# Patient Record
Sex: MALE | Race: WHITE | NOT HISPANIC OR LATINO | URBAN - METROPOLITAN AREA
[De-identification: names, ages, dates, MRNs, and addresses within clinical notes are randomized per-mention and may not be internally consistent; named-entity substitution may affect disease eponyms.]

---

## 2017-11-28 ENCOUNTER — ALLSCRIPTS OFFICE VISIT (OUTPATIENT)
Dept: OTHER | Facility: OTHER | Age: 57
End: 2017-11-28

## 2017-12-13 ENCOUNTER — ALLSCRIPTS OFFICE VISIT (OUTPATIENT)
Dept: OTHER | Facility: OTHER | Age: 57
End: 2017-12-13

## 2018-01-15 NOTE — PROCEDURES
Results/Data    Procedure: Electromyogram and Nerve Conduction Study  Indication: Bilateral Lower Extremities   Referred by Dr Hung Meza  The procedure's were discussed with the patient  Written consent was obtained prior to the procedure and is detailed in the patient's record  Prior to the start of the procedure a time out was taken and the identity of the patient was confirmed via name and date of birth with the patient  The correct site and the procedure to be performed were confirmed  The correct side was confirmed if applicable  The positioning of the patient was verified  The availability of the correct equipment was verified  Procedure Start Time: 9:30    Technique: A sterile concentric needle electrode was used  The patient tolerated the procedure well  There were no complications  Results :Motor and sensory nerve conduction studies were performed on the bilateral peroneal, tibial and sural nerves  The right peroneal and tibial compound motor action potentials were within normal limits  The left peroneal motor terminal latency was prolonged with a low compound motor action potential amplitude and normal conduction velocity distally and across the fibular head  The left tibial motor terminal latency was within normal limits with a normal compound motor action potential amplitude and a slow conduction velocity across the ankle  The bilateral peroneal and tibial F-waves were normal   The right sural sensory action potential was normal   The left sural sensory peak latency was within normal limits with a low sensory action potential amplitude  The bilateral H response was within normal limits  Concentric needle examination was performed and various proximal and distal muscles bilaterally including gluteus medius, vastus lateralis, tibialis anterior, medial gastrocnemius, EDB, L4-5 ,L5-S1 paraspinal myotomes and left AH     There was no evidence of active denervation in any of the muscles tested  Mild decreased recruitment of giant motor units was noted in the left gluteus medius and extensor digitorum brevis  Mild decreased recruitment of polyphasic motor units was noted in the left AH and right EDB  The compound motor unit action potentials were of normal configuration with interference patterns being full of full for effort in the remaining muscles tested  Interpretation:  There is electrophysiologic evidence of a:     1  Moderate chronic left L5 radiculopathy as evidenced by the low peroneal motor amplitude and chronic denervation changes  in the above-mentioned muscles  2  Mild asymmetric sensory motor peripheral neuropathy as evidenced by the abnormal motor and sensory nerve conduction studies  Clinical and imaging correlation of the lumbosacral spine is suggested        Signatures   Electronically signed by : Amanda Garcia MD; Nov 28 2017 10:29AM EST                       (Author)

## 2018-01-23 NOTE — PROCEDURES
Results/Data    Procedure: Electromyogram and Nerve Conduction Study  Indication: Bilateral Upper Extremities   Referred by Dr Chris Al  The procedure's were discussed with the patient  Written consent was obtained prior to the procedure and is detailed in the patient's record  Prior to the start of the procedure a time out was taken and the identity of the patient was confirmed via name and date of birth with the patient  The correct site and the procedure to be performed were confirmed  The correct side was confirmed if applicable  The positioning of the patient was verified  The availability of the correct equipment was verified  Procedure Start Time: 10:30    Technique: A sterile concentric needle electrode was used  The patient tolerated the procedure well  There were no complications  Results :Motor and sensory nerve conduction studies were performed on the bilateral median and ulnar nerves  The right median and ulnar compound motor action potentials were within normal limits  The left median motor terminal latency was within normal limits with a normal compound motor action potential amplitude and a slow conduction velocity across the wrist   The left ulnar motor terminal latency was within normal limits with a normal compound motor action potential amplitude and a slow conduction velocity distally and across the elbow  The bilateral median and ulnar F-waves were normal   The right median sensory peak latency was prolonged with a low sensory action potential amplitude  The left median sensory peak latency was prolonged with normal sensory action potential amplitude  The right ulnar  sensory peak latency was prolonged with normal sensory action potential amplitude  The left ulnar sensory peak latency was prolonged with normal sensory action potential amplitude  The right and left median  and ulnar palmar evoked response  was within normal limits      Concentric needle examination was performed   on various proximal and distal muscles bilaterally including deltoid, biceps, triceps, pronator teres, apb, FDI and low cervical paraspinals  There was no evidence of active denervation in any of the muscles tested  Mild decreased recruitment was noted in the  left abductor pollicis brevis and the left FDI  The compound motor unit action potentials were of normal configuration of the difference patterns being full of full forefoot in the remaining muscles tested  Interpretation:  There is electrophysiologic evidence of a:    1  Bilateral median nerve compression neuropathy at the wrist with demyelinative changes, mild in severity on the right and moderate in severity on the left, consistent with a diagnosis of carpal tunnel syndrome  2   Mild left ulnar neuropathy at the elbow with demyelinative changes  in addition, Moderate ulnar neuropathy at the wrist on the left  with demyelinative changes  3   There is no evidence of a cervical radiculopathy bilaterally  Clinical correlation is recommended        Signatures   Electronically signed by : Donalda Phoenix, MD; Dec 13 2017 11:23AM EST                       (Author)

## 2024-07-02 ENCOUNTER — TELEPHONE (OUTPATIENT)
Age: 64
End: 2024-07-02

## 2024-07-02 NOTE — TELEPHONE ENCOUNTER
Patient calling in to see if [facility] so he can set up an appt. I provided our fax number so [facility] can [facility]. Thank you

## 2024-07-09 ENCOUNTER — TELEPHONE (OUTPATIENT)
Age: 64
End: 2024-07-09

## 2024-07-17 ENCOUNTER — TELEPHONE (OUTPATIENT)
Age: 64
End: 2024-07-17

## 2024-08-01 ENCOUNTER — HOSPITAL ENCOUNTER (OUTPATIENT)
Dept: RADIOLOGY | Facility: HOSPITAL | Age: 64
End: 2024-08-01
Attending: PODIATRIST
Payer: COMMERCIAL

## 2024-08-01 ENCOUNTER — OFFICE VISIT (OUTPATIENT)
Dept: PODIATRY | Facility: CLINIC | Age: 64
End: 2024-08-01
Payer: COMMERCIAL

## 2024-08-01 VITALS
WEIGHT: 162 LBS | DIASTOLIC BLOOD PRESSURE: 78 MMHG | OXYGEN SATURATION: 97 % | HEART RATE: 83 BPM | SYSTOLIC BLOOD PRESSURE: 126 MMHG

## 2024-08-01 DIAGNOSIS — M21.619 BUNION: ICD-10-CM

## 2024-08-01 DIAGNOSIS — M20.42 HAMMER TOE OF LEFT FOOT: ICD-10-CM

## 2024-08-01 DIAGNOSIS — M20.42 HAMMER TOE OF LEFT FOOT: Primary | ICD-10-CM

## 2024-08-01 PROCEDURE — 99203 OFFICE O/P NEW LOW 30 MIN: CPT | Performed by: PODIATRIST

## 2024-08-01 PROCEDURE — 73630 X-RAY EXAM OF FOOT: CPT

## 2024-08-01 RX ORDER — ATORVASTATIN CALCIUM 40 MG/1
TABLET, FILM COATED ORAL
COMMUNITY
Start: 2024-02-21

## 2024-08-01 RX ORDER — CHLORHEXIDINE GLUCONATE ORAL RINSE 1.2 MG/ML
15 SOLUTION DENTAL ONCE
OUTPATIENT
Start: 2024-08-01 | End: 2024-08-01

## 2024-08-01 NOTE — PROGRESS NOTES
Assessment/Plan:     The patient's clinical examination today is significant for a moderate bunion deformity of the left foot.  There is a rigid hammertoe deformity of the left second digit with crossover deformity.  Most of the patient's tenderness can be localized to the dorsal aspect of the left second toe PIPJ.  Pedal pulses are palpable in the left lower extremity.    X-rays of the patient's left foot taken today were personally viewed interpreted.  Findings were significant for bunion deformity of the left foot.  There is a significant contracture deformity of the left second toe with crossover deformity.    Treatment options were discussed with the patient.  Due to the chronicity and severity of his pain, he would like to proceed with surgical dimension.  He would benefit from an Manisha/Akin osteotomy of the first ray in conjunction with a Weil osteotomy and arthrodesis of the left second toe.  The perioperative course was discussed with the patient and he is agreeable to proceed.  Informed consent was obtained.    Will initiate preoperative testing and scheduling.     Diagnoses and all orders for this visit:    Hammer toe of left foot  -     XR foot 3+ vw left; Future  -     Case request operating room: BUNIONECTOMY MANISHA and Darnell osteotomy, Weil OSTEOTOMY second METATARSAL, REPAIR HAMMERTOE / MALLET TOE / CLAW TOE left second toe; Standing  -     Comprehensive metabolic panel; Future  -     CBC and differential; Future  -     Case request operating room: BUNIONECTOMY MANISHA and Darnell osteotomy, Weil OSTEOTOMY second METATARSAL, REPAIR HAMMERTOE / MALLET TOE / CLAW TOE left second toe    Bunion  -     XR foot 3+ vw left; Future  -     Case request operating room: BUNIONECTOMY MANISHA and Darnell osteotomy, Weil OSTEOTOMY second METATARSAL, REPAIR HAMMERTOE / MALLET TOE / CLAW TOE left second toe; Standing  -     Comprehensive metabolic panel; Future  -     CBC and differential; Future  -     Case request  operating room: BUNIONECTOMY MANISHA and Darnell osteotomy, Weil OSTEOTOMY second METATARSAL, REPAIR HAMMERTOE / MALLET TOE / CLAW TOE left second toe    Other orders  -     atorvastatin (LIPITOR) 40 mg tablet; Take by mouth  -     Nursing Communication Warmimg Interventions Implemented; Standing  -     Nursing Communication CHG bath, have staff wash entire body (neck down) per pre-op bathing protocol. Routine, evening prior to, and day of surgery.; Standing  -     Nursing Communication Swab both nares with Povidone-Iodine solution, EXCLUDE if patient has shellfish/Iodine allergy, and replace with nasal alcohol swabstick. Routine, day of surgery, on call to OR; Standing  -     chlorhexidine (PERIDEX) 0.12 % oral rinse 15 mL  -     Void on call to OR; Standing  -     Insert peripheral IV; Standing  -     Diet NPO; Sips with meds; Standing  -     ceFAZolin (ANCEF) 2,000 mg in sodium chloride 0.9 % 50 mL IVPB          Subjective:     Patient ID: Boubacar Burnett Jr is a 64 y.o. male.    The patient presents today for his initial consultation with [facility] podiatry with a chief complaint of chronic left forefoot pain secondary to a hammertoe deformity.  The patient states that the hammertoe has been present for many years but it has gotten worse lately.  It is not painful in all types of close toed shoe gear.  He is also unable to wear his custom left foot orthoses secondary to this.  He is interested in surgical invention at this time.          PAST MEDICAL HISTORY:  Past Medical History:   Diagnosis Date    Hammer toe        PAST SURGICAL HISTORY:  History reviewed. No pertinent surgical history.     ALLERGIES:  Patient has no known allergies.    MEDICATIONS:  Current Outpatient Medications   Medication Sig Dispense Refill    atorvastatin (LIPITOR) 40 mg tablet Take by mouth       No current facility-administered medications for this visit.       SOCIAL HISTORY:  Social History     Socioeconomic History    Marital  status: Single     Spouse name: None    Number of children: None    Years of education: None    Highest education level: None   Occupational History    None   Tobacco Use    Smoking status: Every Day     Current packs/day: 1.00     Types: Cigarettes     Passive exposure: Current    Smokeless tobacco: Never   Vaping Use    Vaping status: Never Used   Substance and Sexual Activity    Alcohol use: Never    Drug use: Never    Sexual activity: Not Currently   Other Topics Concern    None   Social History Narrative    None     Social Determinants of Health     Financial Resource Strain: Not on file   Food Insecurity: Not on file   Transportation Needs: Not on file   Physical Activity: Not on file   Stress: Not on file   Social Connections: Unknown (6/18/2024)    Received from [email or web address]     How often do you feel lonely or isolated from those around you? (Adult - for ages 18 years and over): Not on file   Intimate Partner Violence: Not on file   Housing Stability: Not on file        Review of Systems   Constitutional: Negative.    HENT: Negative.     Eyes: Negative.    Respiratory: Negative.     Cardiovascular: Negative.    Endocrine: Negative.    Musculoskeletal: Negative.    Neurological: Negative.    Hematological: Negative.    Psychiatric/Behavioral: Negative.           Objective:     Physical Exam  Vitals reviewed.   Constitutional:       Appearance: Normal appearance.   HENT:      Head: Normocephalic and atraumatic.      Nose: Nose normal.   Eyes:      Conjunctiva/sclera: Conjunctivae normal.      Pupils: Pupils are equal, round, and reactive to light.   Cardiovascular:      Pulses:           Dorsalis pedis pulses are 2+ on the left side.        Posterior tibial pulses are 2+ on the left side.   Pulmonary:      Effort: Pulmonary effort is normal.   Musculoskeletal:      Left foot: Decreased range of motion. Bunion present.        Feet:    Feet:      Comments: The patient's clinical examination  today is significant for a moderate bunion deformity of the left foot.  There is a rigid hammertoe deformity of the left second digit with crossover deformity.  Most of the patient's tenderness can be localized to the dorsal aspect of the left second toe PIPJ.  Pedal pulses are palpable in the left lower extremity.    Skin:     General: Skin is warm.      Capillary Refill: Capillary refill takes less than 2 seconds.   Neurological:      General: No focal deficit present.      Mental Status: He is alert and oriented to person, place, and time.   Psychiatric:         Mood and Affect: Mood normal.         Behavior: Behavior normal.         Thought Content: Thought content normal.

## 2024-09-04 ENCOUNTER — ANESTHESIA EVENT (OUTPATIENT)
Dept: PERIOP | Facility: HOSPITAL | Age: 64
End: 2024-09-04
Payer: COMMERCIAL

## 2024-09-10 NOTE — PRE-PROCEDURE INSTRUCTIONS
Pre-Surgery Instructions:   Medication Instructions    atorvastatin (LIPITOR) 40 mg tablet Take day of surgery.    Medication instructions for day surgery reviewed. Please use only a sip of water to take your instructed medications. Avoid all over the counter vitamins, supplements and NSAIDS for one week prior to surgery per anesthesia guidelines. Tylenol is ok to take as needed.     You will receive a call one business day prior to surgery with an arrival time and hospital directions. If your surgery is scheduled on a Monday, the hospital will be calling you on the Friday prior to your surgery. If you have not heard from anyone by 8pm, please call the hospital supervisor through the hospital  at [phone]. ([place] [phone] or [place] [phone]).    Do not eat or drink anything after midnight the night before your surgery, including candy, mints, lifesavers, or chewing gum. Do not drink alcohol 24hrs before your surgery. Try not to smoke at least 24hrs before your surgery.       Follow the pre surgery showering instructions as listed in the “My Surgical Experience Booklet” or otherwise provided by your surgeon's office. Do not use a blade to shave the surgical area 1 week before surgery. It is okay to use a clean electric clippers up to 24 hours before surgery. Do not apply any lotions, creams, including makeup, cologne, deodorant, or perfumes after showering on the day of your surgery. Do not use dry shampoo, hair spray, hair gel, or any type of hair products.     No contact lenses, eye make-up, or artificial eyelashes. Remove nail polish, including gel polish, and any artificial, gel, or acrylic nails if possible. Remove all jewelry including rings and body piercing jewelry.     Wear causal clothing that is easy to take on and off. Consider your type of surgery.    Keep any valuables, jewelry, piercings at home. Please bring any specially ordered equipment (sling, braces) if  indicated.    Arrange for a responsible person to drive you to and from the hospital on the day of your surgery. Please confirm the visitor policy for the day of your procedure when you receive your phone call with an arrival time.     Call the surgeon's office with any new illnesses, exposures, or additional questions prior to surgery.    Please reference your “My Surgical Experience Booklet” for additional information to prepare for your upcoming surgery.

## 2024-09-13 ENCOUNTER — APPOINTMENT (OUTPATIENT)
Dept: RADIOLOGY | Facility: HOSPITAL | Age: 64
End: 2024-09-13
Payer: COMMERCIAL

## 2024-09-13 ENCOUNTER — ANESTHESIA (OUTPATIENT)
Dept: PERIOP | Facility: HOSPITAL | Age: 64
End: 2024-09-13
Payer: COMMERCIAL

## 2024-09-13 ENCOUNTER — HOSPITAL ENCOUNTER (OUTPATIENT)
Facility: HOSPITAL | Age: 64
Setting detail: OUTPATIENT SURGERY
Discharge: HOME | End: 2024-09-13
Attending: PODIATRIST | Admitting: PODIATRIST
Payer: COMMERCIAL

## 2024-09-13 VITALS
OXYGEN SATURATION: 98 % | WEIGHT: 158.9 LBS | HEIGHT: 66 IN | TEMPERATURE: 98 F | SYSTOLIC BLOOD PRESSURE: 128 MMHG | DIASTOLIC BLOOD PRESSURE: 70 MMHG | BODY MASS INDEX: 25.54 KG/M2 | RESPIRATION RATE: 14 BRPM | HEART RATE: 15 BPM

## 2024-09-13 DIAGNOSIS — Z98.890 STATUS POST BUNIONECTOMY: Primary | ICD-10-CM

## 2024-09-13 LAB
GLUCOSE SERPL-MCNC: 106 MG/DL (ref 65–140)
GLUCOSE SERPL-MCNC: 122 MG/DL (ref 65–140)

## 2024-09-13 PROCEDURE — 28285 REPAIR OF HAMMERTOE: CPT | Performed by: PODIATRIST

## 2024-09-13 PROCEDURE — C1713 ANCHOR/SCREW BN/BN,TIS/BN: HCPCS | Performed by: PODIATRIST

## 2024-09-13 PROCEDURE — 99024 POSTOP FOLLOW-UP VISIT: CPT | Performed by: PODIATRIST

## 2024-09-13 PROCEDURE — L8641 METATARSAL JOINT IMPLANT: HCPCS | Performed by: PODIATRIST

## 2024-09-13 PROCEDURE — 28299 COR HLX VLGS DOUBLE OSTEOT: CPT | Performed by: PODIATRIST

## 2024-09-13 PROCEDURE — 28308 INCISION OF METATARSAL: CPT | Performed by: PODIATRIST

## 2024-09-13 PROCEDURE — 82948 REAGENT STRIP/BLOOD GLUCOSE: CPT

## 2024-09-13 PROCEDURE — 73630 X-RAY EXAM OF FOOT: CPT

## 2024-09-13 DEVICE — CANNULATED SCREW
Type: IMPLANTABLE DEVICE | Site: FOOT | Status: FUNCTIONAL
Brand: ASNIS

## 2024-09-13 DEVICE — HAMMERTOE K-WIRE
Type: IMPLANTABLE DEVICE | Site: FOOT | Status: FUNCTIONAL
Brand: PHALINX

## 2024-09-13 DEVICE — IMPLANTABLE DEVICE
Type: IMPLANTABLE DEVICE | Site: FOOT | Status: FUNCTIONAL
Brand: PHALINX

## 2024-09-13 DEVICE — OSTEOSYNTHESIS COMPRESSION STAPLE
Type: IMPLANTABLE DEVICE | Site: FOOT | Status: FUNCTIONAL
Brand: EASY CLIP

## 2024-09-13 RX ORDER — SODIUM CHLORIDE, SODIUM LACTATE, POTASSIUM CHLORIDE, CALCIUM CHLORIDE 600; 310; 30; 20 MG/100ML; MG/100ML; MG/100ML; MG/100ML
INJECTION, SOLUTION INTRAVENOUS CONTINUOUS PRN
Status: DISCONTINUED | OUTPATIENT
Start: 2024-09-13 | End: 2024-09-13

## 2024-09-13 RX ORDER — HYDROMORPHONE HCL/PF 1 MG/ML
0.5 SYRINGE (ML) INJECTION
Status: DISCONTINUED | OUTPATIENT
Start: 2024-09-13 | End: 2024-09-13 | Stop reason: HOSPADM

## 2024-09-13 RX ORDER — OXYCODONE HYDROCHLORIDE 5 MG/1
5 TABLET ORAL ONCE AS NEEDED
Status: DISCONTINUED | OUTPATIENT
Start: 2024-09-13 | End: 2024-09-13 | Stop reason: HOSPADM

## 2024-09-13 RX ORDER — ONDANSETRON 2 MG/ML
4 INJECTION INTRAMUSCULAR; INTRAVENOUS ONCE AS NEEDED
Status: DISCONTINUED | OUTPATIENT
Start: 2024-09-13 | End: 2024-09-13 | Stop reason: HOSPADM

## 2024-09-13 RX ORDER — OXYCODONE AND ACETAMINOPHEN 5; 325 MG/1; MG/1
1 TABLET ORAL EVERY 4 HOURS PRN
Qty: 30 TABLET | Refills: 0 | Status: SHIPPED | OUTPATIENT
Start: 2024-09-13

## 2024-09-13 RX ORDER — PROPOFOL 10 MG/ML
INJECTION, EMULSION INTRAVENOUS AS NEEDED
Status: DISCONTINUED | OUTPATIENT
Start: 2024-09-13 | End: 2024-09-13

## 2024-09-13 RX ORDER — ACETAMINOPHEN 325 MG/1
975 TABLET ORAL ONCE
Status: COMPLETED | OUTPATIENT
Start: 2024-09-13 | End: 2024-09-13

## 2024-09-13 RX ORDER — CEFAZOLIN SODIUM 2 G/50ML
2000 SOLUTION INTRAVENOUS ONCE
Status: COMPLETED | OUTPATIENT
Start: 2024-09-13 | End: 2024-09-13

## 2024-09-13 RX ORDER — MAGNESIUM HYDROXIDE 1200 MG/15ML
LIQUID ORAL AS NEEDED
Status: DISCONTINUED | OUTPATIENT
Start: 2024-09-13 | End: 2024-09-13 | Stop reason: HOSPADM

## 2024-09-13 RX ORDER — FENTANYL CITRATE/PF 50 MCG/ML
25 SYRINGE (ML) INJECTION
Status: DISCONTINUED | OUTPATIENT
Start: 2024-09-13 | End: 2024-09-13 | Stop reason: HOSPADM

## 2024-09-13 RX ORDER — PROPOFOL 10 MG/ML
INJECTION, EMULSION INTRAVENOUS CONTINUOUS PRN
Status: DISCONTINUED | OUTPATIENT
Start: 2024-09-13 | End: 2024-09-13

## 2024-09-13 RX ORDER — CHLORHEXIDINE GLUCONATE ORAL RINSE 1.2 MG/ML
15 SOLUTION DENTAL ONCE
Status: COMPLETED | OUTPATIENT
Start: 2024-09-13 | End: 2024-09-13

## 2024-09-13 RX ADMIN — PROPOFOL 40 MG: 10 INJECTION, EMULSION INTRAVENOUS at 07:35

## 2024-09-13 RX ADMIN — PROPOFOL 50 MG: 10 INJECTION, EMULSION INTRAVENOUS at 07:40

## 2024-09-13 RX ADMIN — CHLORHEXIDINE GLUCONATE 0.12% ORAL RINSE 15 ML: 1.2 LIQUID ORAL at 07:12

## 2024-09-13 RX ADMIN — ACETAMINOPHEN 975 MG: 325 TABLET, FILM COATED ORAL at 10:35

## 2024-09-13 RX ADMIN — PROPOFOL 150 MCG/KG/MIN: 10 INJECTION, EMULSION INTRAVENOUS at 07:35

## 2024-09-13 RX ADMIN — SODIUM CHLORIDE, SODIUM LACTATE, POTASSIUM CHLORIDE, AND CALCIUM CHLORIDE: .6; .31; .03; .02 INJECTION, SOLUTION INTRAVENOUS at 07:32

## 2024-09-13 RX ADMIN — PROPOFOL 30 MG: 10 INJECTION, EMULSION INTRAVENOUS at 07:36

## 2024-09-13 RX ADMIN — CEFAZOLIN SODIUM 2000 MG: 2 SOLUTION INTRAVENOUS at 07:38

## 2024-09-13 RX ADMIN — PHENYLEPHRINE HYDROCHLORIDE 100 MCG: 10 INJECTION INTRAVENOUS at 08:11

## 2024-09-13 NOTE — ANESTHESIA POSTPROCEDURE EVALUATION
Post-Op Assessment Note    CV Status:  Stable  Pain Score: 0    Pain management: adequate       Mental Status:  Alert and awake   Hydration Status:  Euvolemic   PONV Controlled:  Controlled   Airway Patency:  Patent     Post Op Vitals Reviewed: Yes    No anethesia notable event occurred.    Staff: Anesthesiologist     Reason for prolonged intubation > 24 hours:  N/aReason for prolonged intubation > 48 hours:  N/a          BP   112/76   Temp 98.3   Pulse 61   Resp 15   SpO2 100

## 2024-09-13 NOTE — INTERVAL H&P NOTE
H&P reviewed. After examining the patient I find no changes in the patients condition since the H&P had been written.    Vitals:    09/13/24 0657   BP: 136/71   Pulse: (!) 54   Resp: 16   Temp: 98 °F (36.7 °C)   SpO2: 99%

## 2024-09-13 NOTE — OP NOTE
OPERATIVE REPORT - Podiatry  PATIENT NAME: Boubacar Burnett Jr    :  1960  MRN: [id number]  Pt Location: [facility] OR ROOM 03    SURGERY DATE: 2024    Surgeons and Role:     * Blaine Fraser DPM - Primary     * Ezra Lopes DPM - Assisting    Pre-op Diagnosis:  Hammer toe of left foot [M20.42]  Bunion [M21.619]    Post-Op Diagnosis Codes:     * Hammer toe of left foot [M20.42]     * Bunion [M21.619]    Procedure(s) (LRB):  BUNIONECTOMY MANISHA and Darnell osteotomy (Left)  Weil OSTEOTOMY second METATARSAL (Left)  REPAIR HAMMERTOE / MALLET TOE / CLAW TOE left second toe (Left)    Specimen(s):  * No specimens in log *    Estimated Blood Loss:   Minimal    Drains:  * No LDAs found *      Materials:  Implant Name Type Inv. Item Serial No.  Lot No. LRB No. Used Action   SCREW  NEERAJ 3 X 16MM ASNIS MICRO - [id number]  SCREW  NEERAJ 3 X 16MM ASNIS MICRO  [employer]  Left 1 Implanted   STAPLE EASYCLIP 10 X 10 X 10MM - [id number]  STAPLE EASYCLIP 10 X 10 X 10MM  [employer] [id number] Left 1 Implanted   SCREW NEERAJ 3 X 18MM ASNIS MICRO - [id number]  SCREW NEERAJ 3 X 18MM ASNIS MICRO  [employer]  Left 1 Implanted   SCREW NEERAJ 2 X 12MM ASNIS MICRO - [id number]  SCREW NEERAJ 2 X 12MM ASNIS MICRO  [employer]  Left 1 Implanted   IMPLANT PHALINX NEERAJ MED - [id number]  IMPLANT PHALINX NEERAJ MED  [employer]  Left 1 Implanted   K-WIRE 1.1MM X 4IN - [id number]  K-WIRE 1.1MM X 4IN  [employer]  Left 1 Implanted         Operative Findings:  Consistent with Diagnosis    Complications:   None    Procedure and Technique:     Under mild sedation, the patient was brought into the operating room and placed on the operating room table in the supine position. IV sedation was achieved by anesthesia team and a universal timeout was performed where all parties are in agreement of correct patient, correct procedure and correct site. A pneumatic tourniquet was then placed over the patient's left lower extremity with  ample padding. A local block was performed consisting of 20 ml of 1% Lidocaine and 0.25% Bupivacaine in a 1:1 mixture. The foot was then prepped and draped in the usual aseptic manner. An esmarch bandage was used to exsangunate the foot and the pneumatic tourniquet was then inflated to 250 mmHg.    Attention was then directed to the dorsal aspect of the first metatarsophalangeal joint medial to the EHL tendon where an approximately 6 cm linear incision was made through skin and subcutaneous tissue. The incision was deepened through the subcutaneous tissues using sharp and blunt dissection. Care was taken to identify and retract all vital neural and vascular structures. All bleeders were cauterized and ligated as necessary. A capsuloptomy was performed over the dorsal aspect of the MPJ. The periosteal and capsular structures were then carefully dissected free of their osseous attachments and reflected medially and laterally, thus exposing the head of the first metatarsal at the operative site. Attention was then directed to the 1st interspace via the original skin incision where the dissection was continued deep using sharp dissection down to the conjoined tendon of the adductor halluces was then identified and transected at its attachment. At this time the lateral contraction presents on the hallux was noted to be reduced and the sesamoid apparatus was noted to float into a more corrected medial position. Attention was then directed to the first met head where the medial prominence was resected by the sagittal bone saw. A k-wire was used as a guidewire at the proximal-medial aspect of the 1st met head. A through and through V type osteotomy was made at a 60 degree angle. This cut was created in the metataphyseal region of the bone utilizing a sagittal bone saw and the apices of this osteotomy pointing proximal plantarly and proximal dorsally. Upon completion of this osteotomy, the capital fragment was distracted and  shifted laterally into a more corrected position and impacted onto the shaft of the first met. 2 K wires were used as temp fixation across the osteotomy site.With proper AO technique two styker screws serves as fixation across the osteotomy site. Attention was directed to the remaining medial bone shelf proximal to the osteotomy site which was resected using a sagittal saw and passed from operative field.    Darnell osteotomy:  Correction of the deformity was assessed at this time and a Darnell osteotomy was deemed necessary.  Via the original skin incision the mid base of the proximal phalanx was further dissected out and periosteal structures were reflected medial laterally. With a sagittal saw,  the 1st medial to lateral cut was performed parallel to the base of the proximal phalanx through and through from dorsal to plantar with leaving the lateral cortex intact.  A 2nd cut was made at bed approximately 15 degree angle distally from the original cut, again in a through to through dorsal to plantar fashion leaving the lateral cortex intact. The osteotomy was closed with manual pressure and secured with one staple 10 x 10mm. At this time correction of deformity was noted to be excellent.      Metatarsal Osteotomy:    Attention was then drawn to the second toe.  A hammertoe deformity was noted at the metatarsal phalangeal joint as well as the proximal interphalangeal joint.  Utilizing 15 blade a 4 cm linear incision was made from the metatarsal phalangeal joint extending just distal to the metatarsophalangeal joint.  Blunt dissection was carried down.  A linear incision was made overlying the metatarsal phalangeal joint to expose the joint.  Soft tissue structures were reflected off of it.  Utilizing a sagittal saw a osteotomy of the metatarsal head was made parallel to the weightbearing surface.  The osteotomy was then fixated utilizing 1 Saugerties screw.  Remaining bony prominence was removed utilizing a  "inocencio.    Hammertoe Repair:     Attention was then directed distally to the proximal phalangeal joint.  A transverse incision was made overlying the extensor tendon to expose the proximal interphalangeal joint.  Tendon was reflected proximally to expose the phalanx head.  Utilizing a sagittal saw the head of the phalanx was resected.  The base of the middle phalanx was also resected down to bleeding healthy bone.  A K wire was then retrograde drilled through the middle phalanx out the distal phalanx.  Under standard manufacture recommendations a phalanx implant was then placed within the proximal interphalangeal joint.  A K wire was then placed within the head of the metatarsal phalangeal joint.  Full reduction of deformity of the hammertoe was noted at this time.    Both incisions were copiously flushed utilizing sterile saline.  Tendon reapproximation was performed utilizing 3-0 Vicryl.  Deep closure was achieved utilizing 3-0 Vicryl.  Subcuticular closure was achieved with 4-0 Vicryl.  Skin closure achieved utilizing 4-0 nylon.  The foot was then cleansed and dried.  A postoperative dressing consisting of Xeroform DSD and lightly wrapped Ace wrap was applied to the foot.  The tourniquet was then deflated and immediate hyperemic flush was noted to all digits.  Patient tolerated procedure well with no immediate complications    Dr. Fraser was present during the entire procedure and participated in all key aspects.    SIGNATURE: Ezra Lopes DPM  DATE: September 13, 2024  TIME: 9:24 AM      Portions of the record may have been created with voice recognition software. Occasional wrong word or \"sound a like\" substitutions may have occurred due to the inherent limitations of voice recognition software. Read the chart carefully and recognize, using context, where substitutions have occurred.            "

## 2024-09-13 NOTE — ANESTHESIA PREPROCEDURE EVALUATION
"Procedure:  BUNIONECTOMY MANISHA and Darnell osteotomy (Left: Foot)  Weil OSTEOTOMY second METATARSAL (Left: Foot)  REPAIR HAMMERTOE / MALLET TOE / CLAW TOE left second toe (Left: Foot)    Relevant Problems   No relevant active problems    DM2, follows with cardiology for irregular heart beat and last was seen in may. No records on file but patient confers that cardiology is following requiring no medications    \"Shocks down his legs\" worse on the left, during working when lifting things, states that this has been dealing with this for 40 yrs.     Physical Exam    Airway    Mallampati score: II  TM Distance: >3 FB  Neck ROM: full     Dental        Cardiovascular  Cardiovascular exam normal    Pulmonary  Pulmonary exam normal     Other Findings        Anesthesia Plan  ASA Score- 2     Anesthesia Type- IV sedation with anesthesia with ASA Monitors.         Additional Monitors:     Airway Plan:            Plan Factors-Exercise tolerance (METS): >4 METS.    Chart reviewed. EKG reviewed.  Existing labs reviewed. Patient summary reviewed.    Patient is not a current smoker.  Patient did not smoke on day of surgery.    Obstructive sleep apnea risk education given perioperatively.        Induction- intravenous.    Postoperative Plan-     Perioperative Resuscitation Plan - Level 1 - Full Code.       Informed Consent- Anesthetic plan and risks discussed with patient.  I personally reviewed this patient with the CRNA. Discussed and agreed on the Anesthesia Plan with the CRNA..        "

## 2024-09-13 NOTE — DISCHARGE INSTR - AVS FIRST PAGE
[facility]  Dr. Fraser  Post-Operative Instructions    1. Take your prescribed medication as needed. You can take ibuprofen in between doses of the narcotic if needed.   2. Upon arrival at home, lie down and elevate your surgical foot on 2 pillows.  3. Remain quiet, off your feet as much as possible, for the first 24-48 hours. This is when your feet first swell and may become painful. After 48 hours you may begin limited walking following these restrictions: weight bearing as tolerated in a surgical shoe at all times      4. Drink large quantities of water. Consume no alcohol. Continue a well-balanced diet.  5. Report any unusual discomfort or fever to this office.  6. A limited amount of discomfort and swelling is to be expected. In some cases the skin may take on a bruised appearance. The surgical solution that was applied to your foot prior to the operation is dark in color and the operation site may appear to be oozing when it actually is not.  7. A slight amount of blood is to be expected, and is no cause for alarm. Do not remove the dressings. If there is active bleeding and if the bleeding persists, add additional gauze to the bandage, apply direct pressure, elevate your feet and call this office.  8. Do not get the dressings wet. As regular bathing may be inconvenient, sponge baths are recommended. If you shower, make sure the dressing stays dry.   9. When anesthesia wears off and if any discomfort should be present, apply an ice pack directly over the operated area for 15 minute intervals for several hours or until the pain leaves. (USE IN EXCESS OF 15 MINUTES COULD CAUSE FROSTBITE). Do not use hot water bags or electric pads. A convenient icepack can be made by placing ice cubes in a plastic bag and covering this with a towel.  10. If necessary, take a mild laxative before retiring.  11. Wear your special open shoes anytime you put weight on your foot, even if it is just to walk to the bathroom and  back. It will probably be 2 or 3 weeks before you will be permitted to try regular shoes.  12. Having performed the operation, we are interested in a prompt recovery. Please cooperate by following the above instructions.  13. Please call to confirm your post-op appointment or call with any other questions.

## 2024-09-13 NOTE — DISCHARGE SUMMARY
Discharge Summary Outpatient Procedure Podiatry -   Boubacar Nixonjose r Goldberg 64 y.o. male MRN: [id number]  Unit/Bed#: OR POOL Encounter: [id number]    Admission Date: 9/13/2024     Admitting Diagnosis: Hammer toe of left foot [M20.42]  Bunion [M21.619]    Discharge Diagnosis: same    Procedures Performed: BUNIONECTOMY MANISHA and Darnell osteotomy: 45873 (CPT®)  Weil OSTEOTOMY second METATARSAL: 18856 (CPT®)  REPAIR HAMMERTOE / MALLET TOE / CLAW TOE left second toe: 74699 (CPT®)    Complications: none    Condition at Discharge: stable    Discharge instructions/Information to patient and family:   See after visit summary for information provided to patient and family.      Provisions for Follow-Up Care/Important appointments:  See after visit summary for information related to follow-up care and any pertinent home health orders.      Discharge Medications:  See after visit summary for reconciled discharge medications provided to patient and family.     52

## 2024-09-16 ENCOUNTER — OFFICE VISIT (OUTPATIENT)
Dept: PODIATRY | Facility: CLINIC | Age: 64
End: 2024-09-16

## 2024-09-16 VITALS
OXYGEN SATURATION: 100 % | HEART RATE: 76 BPM | BODY MASS INDEX: 25.65 KG/M2 | HEIGHT: 66 IN | DIASTOLIC BLOOD PRESSURE: 78 MMHG | SYSTOLIC BLOOD PRESSURE: 129 MMHG

## 2024-09-16 DIAGNOSIS — M20.42 HAMMER TOE OF LEFT FOOT: ICD-10-CM

## 2024-09-16 DIAGNOSIS — M20.12 HALLUX VALGUS OF LEFT FOOT: ICD-10-CM

## 2024-09-16 DIAGNOSIS — Z98.890 POSTOPERATIVE STATE: Primary | ICD-10-CM

## 2024-09-16 PROCEDURE — 99024 POSTOP FOLLOW-UP VISIT: CPT | Performed by: PODIATRIST

## 2024-09-16 NOTE — PROGRESS NOTES
Assessment/Plan:     The patient's clinical examination today significant for an altered alignment to the left second toe.  It is clearly out of place when compared to his postoperative films.  I am unsure as to whether the patient stubbed his toe or not since his surgery on Friday.  The patient cannot recall injuring or stopping the left second digit.  However due to this malalignment, I did pull the K wire today.  Will splint the toe in a plantigrade fashion as it heals.  Incision lines are well coapted without any signs of infection.  There are some forming blisters likely secondary to his postoperative edema.  These were prepped with Betadine and lanced with a sterile #15 blade without complication.  Clear serous fluid was noted.    The wounds were redressed with a dry sterile dressing and Xeroform as a contact layer.  He will keep the dressing clean dry and intact until follow-up in 1 week.  For better stability, the patient was fitted for a low tide Cam boot today to offload the postsurgical site and hopefully improve his gait.  He may continue use of the rollator on an as-needed basis.     Diagnoses and all orders for this visit:    Postoperative state  -     Cam Boot    Hallux valgus of left foot  -     Cam Boot    Hammer toe of left foot  -     Cam Boot          Subjective:     Patient ID: Boubacar Burnett Jr is a 64 y.o. male.    The patient presents today for follow-up status post left foot bunionectomy and repair of a contracture deformity of the left second digit.  He does note some postoperative plain that is currently being managed with Tylenol.         Review of Systems   Constitutional: Negative.    HENT: Negative.     Eyes: Negative.    Respiratory: Negative.     Cardiovascular: Negative.    Endocrine: Negative.    Musculoskeletal: Negative.    Neurological: Negative.    Hematological: Negative.    Psychiatric/Behavioral: Negative.           Objective:     Physical Exam  Vitals reviewed.    Constitutional:       Appearance: Normal appearance.   HENT:      Head: Normocephalic and atraumatic.      Nose: Nose normal.   Eyes:      Conjunctiva/sclera: Conjunctivae normal.      Pupils: Pupils are equal, round, and reactive to light.   Pulmonary:      Effort: Pulmonary effort is normal.   Skin:     General: Skin is warm.      Capillary Refill: Capillary refill takes less than 2 seconds.   Neurological:      General: No focal deficit present.      Mental Status: He is alert and oriented to person, place, and time.   Psychiatric:         Mood and Affect: Mood normal.         Behavior: Behavior normal.         Thought Content: Thought content normal.

## 2024-09-23 ENCOUNTER — OFFICE VISIT (OUTPATIENT)
Dept: PODIATRY | Facility: CLINIC | Age: 64
End: 2024-09-23

## 2024-09-23 VITALS
DIASTOLIC BLOOD PRESSURE: 79 MMHG | HEIGHT: 66 IN | OXYGEN SATURATION: 98 % | SYSTOLIC BLOOD PRESSURE: 126 MMHG | BODY MASS INDEX: 25.65 KG/M2 | HEART RATE: 66 BPM

## 2024-09-23 DIAGNOSIS — Z98.890 POSTOPERATIVE STATE: Primary | ICD-10-CM

## 2024-09-23 DIAGNOSIS — M20.12 HALLUX VALGUS OF LEFT FOOT: ICD-10-CM

## 2024-09-23 DIAGNOSIS — M20.42 HAMMER TOE OF LEFT FOOT: ICD-10-CM

## 2024-09-23 PROCEDURE — 99024 POSTOP FOLLOW-UP VISIT: CPT | Performed by: PODIATRIST

## 2024-09-23 NOTE — PROGRESS NOTES
Assessment/Plan:     The patient's postoperative visit today is relative benign.  Incision lines well coapted.  The sterile blisters to the dorsum of the left foot are resolving without any signs of infection.  K wire removal site to the tip of the left second toe is healed.  Alignment is satisfactory.    The incision lines were cleansed with a Hibiclens scrub then painted with Betadine.  A dry sterile dressing was then applied with Xeroform as a contact layer.  The patient will continue guarded weightbearing in cam boot until follow-up in 1 week for his next postoperative dressing change.     Diagnoses and all orders for this visit:    Postoperative state    Hallux valgus of left foot    Hammer toe of left foot          Subjective:     Patient ID: Boubacar Burnett Jr is a 64 y.o. male.    The patient presents today for follow-up status post left foot bunion and hammertoe correction.  He is doing well from a postop pain standpoint, stating that he has not had much pain.  He is tolerating the cam boot well.        Review of Systems   Constitutional: Negative.    HENT: Negative.     Eyes: Negative.    Respiratory: Negative.     Cardiovascular: Negative.    Endocrine: Negative.    Musculoskeletal: Negative.    Neurological: Negative.    Hematological: Negative.    Psychiatric/Behavioral: Negative.           Objective:     Physical Exam  Vitals reviewed.   Constitutional:       Appearance: Normal appearance.   HENT:      Head: Normocephalic and atraumatic.      Nose: Nose normal.   Eyes:      Conjunctiva/sclera: Conjunctivae normal.      Pupils: Pupils are equal, round, and reactive to light.   Cardiovascular:      Pulses:           Dorsalis pedis pulses are 2+ on the left side.        Posterior tibial pulses are 2+ on the left side.   Pulmonary:      Effort: Pulmonary effort is normal.   Feet:      Comments: The patient's postoperative visit today is relative benign.  Incision lines well coapted.  The sterile  blisters to the dorsum of the left foot are resolving without any signs of infection.  K wire removal site to the tip of the left second toe is healed.  Alignment is satisfactory.    Skin:     General: Skin is warm.      Capillary Refill: Capillary refill takes less than 2 seconds.   Neurological:      General: No focal deficit present.      Mental Status: He is alert and oriented to person, place, and time.   Psychiatric:         Mood and Affect: Mood normal.         Behavior: Behavior normal.         Thought Content: Thought content normal.

## 2024-09-30 ENCOUNTER — OFFICE VISIT (OUTPATIENT)
Dept: PODIATRY | Facility: CLINIC | Age: 64
End: 2024-09-30

## 2024-09-30 VITALS
SYSTOLIC BLOOD PRESSURE: 124 MMHG | DIASTOLIC BLOOD PRESSURE: 74 MMHG | HEIGHT: 66 IN | OXYGEN SATURATION: 98 % | BODY MASS INDEX: 25.65 KG/M2 | HEART RATE: 72 BPM

## 2024-09-30 DIAGNOSIS — M20.12 HALLUX VALGUS OF LEFT FOOT: ICD-10-CM

## 2024-09-30 DIAGNOSIS — M20.42 HAMMER TOE OF LEFT FOOT: ICD-10-CM

## 2024-09-30 DIAGNOSIS — Z98.890 POSTOPERATIVE STATE: Primary | ICD-10-CM

## 2024-09-30 PROCEDURE — 99024 POSTOP FOLLOW-UP VISIT: CPT | Performed by: PODIATRIST

## 2024-09-30 NOTE — PROGRESS NOTES
Assessment/Plan:     The patient's clinical examination today significant for well coapted incision lines to the left foot.  Sutures were removed today without complication.  There are signs of infection noted.  There has been good interval reduction in postoperative edema and calor.    Sutures were removed today.  The incision lines were dressed with triple antibiotic ointment and a dry sterile dressing.  He can start getting his foot wet starting tomorrow morning.  Continue guarded weightbearing in cam boot as needed.      Recommend follow-up in 1 week for repeat x-rays of the right foot.     Diagnoses and all orders for this visit:    Postoperative state    Hallux valgus of left foot    Hammer toe of left foot          Subjective:     Patient ID: Boubacar Burnett Jr is a 64 y.o. male.    The patient presents today for follow-up status post left forefoot surgery.  He has been doing well from a postoperative pain standpoint.  He is currently ambulating in a cam boot and tolerating it well.        Review of Systems   Constitutional: Negative.    HENT: Negative.     Eyes: Negative.    Respiratory: Negative.     Cardiovascular: Negative.    Endocrine: Negative.    Musculoskeletal: Negative.    Neurological: Negative.    Hematological: Negative.    Psychiatric/Behavioral: Negative.           Objective:     Physical Exam  Vitals reviewed.   Constitutional:       Appearance: Normal appearance.   HENT:      Head: Normocephalic and atraumatic.      Nose: Nose normal.   Eyes:      Conjunctiva/sclera: Conjunctivae normal.      Pupils: Pupils are equal, round, and reactive to light.   Cardiovascular:      Pulses:           Dorsalis pedis pulses are 2+ on the left side.        Posterior tibial pulses are 2+ on the left side.   Pulmonary:      Effort: Pulmonary effort is normal.   Feet:      Comments: The patient's clinical examination today significant for well coapted incision lines to the left foot.  Sutures were removed  today without complication.  There are signs of infection noted.  There has been good interval reduction in postoperative edema and calor.    Skin:     General: Skin is warm.      Capillary Refill: Capillary refill takes less than 2 seconds.   Neurological:      General: No focal deficit present.      Mental Status: He is alert and oriented to person, place, and time.   Psychiatric:         Mood and Affect: Mood normal.         Behavior: Behavior normal.         Thought Content: Thought content normal.

## 2024-10-07 ENCOUNTER — HOSPITAL ENCOUNTER (OUTPATIENT)
Dept: RADIOLOGY | Facility: HOSPITAL | Age: 64
Discharge: HOME | End: 2024-10-07
Attending: PODIATRIST
Payer: COMMERCIAL

## 2024-10-07 ENCOUNTER — OFFICE VISIT (OUTPATIENT)
Dept: PODIATRY | Facility: CLINIC | Age: 64
End: 2024-10-07

## 2024-10-07 VITALS
OXYGEN SATURATION: 98 % | HEART RATE: 71 BPM | BODY MASS INDEX: 25.65 KG/M2 | HEIGHT: 66 IN | SYSTOLIC BLOOD PRESSURE: 130 MMHG | DIASTOLIC BLOOD PRESSURE: 82 MMHG

## 2024-10-07 DIAGNOSIS — M20.42 HAMMER TOE OF LEFT FOOT: ICD-10-CM

## 2024-10-07 DIAGNOSIS — M20.12 HALLUX VALGUS OF LEFT FOOT: ICD-10-CM

## 2024-10-07 DIAGNOSIS — Z98.890 POSTOPERATIVE STATE: Primary | ICD-10-CM

## 2024-10-07 DIAGNOSIS — Z98.890 POSTOPERATIVE STATE: ICD-10-CM

## 2024-10-07 PROCEDURE — 73630 X-RAY EXAM OF FOOT: CPT

## 2024-10-07 PROCEDURE — 99024 POSTOP FOLLOW-UP VISIT: CPT | Performed by: PODIATRIST

## 2024-10-07 NOTE — PROGRESS NOTES
Assessment/Plan:     The patient's clinical examination today is significant for a change suture that was removed without complication.  Alignment remains satisfactory.  Moderate postoperative edema is still present without any signs of infection.    X-rays of the left foot taken today were personally reviewed and interpreted.  Findings were significant for intact orthopedic hardware without any signs of hardware failure.    Continue weightbearing as tolerated in a cam boot.  The patient may transition to a sneaker type shoe as tolerated.  Recommend follow-up in 2 weeks.     Diagnoses and all orders for this visit:    Postoperative state  -     XR foot 3+ vw left; Future    Hallux valgus of left foot  -     XR foot 3+ vw left; Future    Hammer toe of left foot          Subjective:     Patient ID: Boubacar Burnett Jr is a 64 y.o. male.    The patient presents today for follow-up status post left forefoot surgery.  The patient continues to do well from postoperative pain standpoint.  He is still ambulating in a cam boot as he finds it more comfortable than his shoe.           Review of Systems   Constitutional: Negative.    HENT: Negative.     Eyes: Negative.    Respiratory: Negative.     Cardiovascular: Negative.    Endocrine: Negative.    Musculoskeletal: Negative.    Neurological: Negative.    Hematological: Negative.    Psychiatric/Behavioral: Negative.           Objective:     Physical Exam  Vitals reviewed.   Constitutional:       Appearance: Normal appearance.   HENT:      Head: Normocephalic and atraumatic.      Nose: Nose normal.   Eyes:      Conjunctiva/sclera: Conjunctivae normal.      Pupils: Pupils are equal, round, and reactive to light.   Cardiovascular:      Pulses:           Dorsalis pedis pulses are 2+ on the left side.        Posterior tibial pulses are 2+ on the left side.   Pulmonary:      Effort: Pulmonary effort is normal.   Feet:      Comments: The patient's clinical examination today is  significant for a change suture that was removed without complication.  Alignment remains satisfactory.  Moderate postoperative edema is still present without any signs of infection.    Skin:     General: Skin is warm.      Capillary Refill: Capillary refill takes less than 2 seconds.   Neurological:      General: No focal deficit present.      Mental Status: He is alert and oriented to person, place, and time.   Psychiatric:         Mood and Affect: Mood normal.         Behavior: Behavior normal.         Thought Content: Thought content normal.

## 2024-10-23 ENCOUNTER — OFFICE VISIT (OUTPATIENT)
Dept: PODIATRY | Facility: CLINIC | Age: 64
End: 2024-10-23

## 2024-10-23 VITALS
HEART RATE: 76 BPM | SYSTOLIC BLOOD PRESSURE: 130 MMHG | HEIGHT: 66 IN | DIASTOLIC BLOOD PRESSURE: 75 MMHG | OXYGEN SATURATION: 96 % | BODY MASS INDEX: 25.65 KG/M2

## 2024-10-23 DIAGNOSIS — M20.42 HAMMER TOE OF LEFT FOOT: ICD-10-CM

## 2024-10-23 DIAGNOSIS — Z98.890 POSTOPERATIVE STATE: Primary | ICD-10-CM

## 2024-10-23 DIAGNOSIS — M20.12 HALLUX VALGUS OF LEFT FOOT: ICD-10-CM

## 2024-10-23 PROCEDURE — 99024 POSTOP FOLLOW-UP VISIT: CPT | Performed by: PODIATRIST

## 2024-10-23 NOTE — PROGRESS NOTES
Assessment/Plan:     The patient's clinical examination today significant for stable postoperative appearance of his left forefoot.  The left foot bunion site is well-healed with good passive range of motion at the first MTPJ.  There is still some slight elevatus of the left second toe, however overall alignment is satisfactory.  He has no pain.    The patient seems to be doing fairly well from a clinical standpoint.  I have encouraged him to get back into a supportive shoe such as a sneaker as tolerated.  Will attempt to transition himself out of the cam boot.    Recommend follow-up in 4 weeks.     Diagnoses and all orders for this visit:    Postoperative state    Hallux valgus of left foot    Hammer toe of left foot          Subjective:     Patient ID: Boubacar Burnett Jr is a 64 y.o. male.    The patient resents today for follow-up status post left foot bunionectomy and repair of a crossover contracture deformity of the second digit.  He has been doing well from postoperative pain standpoint.  He notes no pain or discomfort.  He is still ambulating the cam boot.        Review of Systems   Constitutional: Negative.    HENT: Negative.     Eyes: Negative.    Respiratory: Negative.     Cardiovascular: Negative.    Endocrine: Negative.    Musculoskeletal: Negative.    Neurological: Negative.    Hematological: Negative.    Psychiatric/Behavioral: Negative.           Objective:     Physical Exam  Vitals reviewed.   Constitutional:       Appearance: Normal appearance.   HENT:      Head: Normocephalic and atraumatic.      Nose: Nose normal.   Eyes:      Conjunctiva/sclera: Conjunctivae normal.      Pupils: Pupils are equal, round, and reactive to light.   Cardiovascular:      Pulses:           Dorsalis pedis pulses are 2+ on the left side.        Posterior tibial pulses are 2+ on the left side.   Pulmonary:      Effort: Pulmonary effort is normal.   Feet:      Left foot:      Skin integrity: Skin integrity normal.       Comments: The patient's clinical examination today significant for stable postoperative appearance of his left forefoot.  The left foot bunion site is well-healed with good passive range of motion at the first MTPJ.  There is still some slight elevatus of the left second toe, however overall alignment is satisfactory.  He has no pain.  Skin:     General: Skin is warm.      Capillary Refill: Capillary refill takes less than 2 seconds.   Neurological:      General: No focal deficit present.      Mental Status: He is alert and oriented to person, place, and time.   Psychiatric:         Mood and Affect: Mood normal.         Behavior: Behavior normal.         Thought Content: Thought content normal.

## 2024-11-18 ENCOUNTER — OFFICE VISIT (OUTPATIENT)
Dept: PODIATRY | Facility: CLINIC | Age: 64
End: 2024-11-18

## 2024-11-18 VITALS
HEIGHT: 66 IN | SYSTOLIC BLOOD PRESSURE: 130 MMHG | BODY MASS INDEX: 25.65 KG/M2 | DIASTOLIC BLOOD PRESSURE: 79 MMHG | OXYGEN SATURATION: 99 % | HEART RATE: 68 BPM

## 2024-11-18 DIAGNOSIS — M20.42 HAMMER TOE OF LEFT FOOT: ICD-10-CM

## 2024-11-18 DIAGNOSIS — Z98.890 POSTOPERATIVE STATE: Primary | ICD-10-CM

## 2024-11-18 DIAGNOSIS — M20.12 HALLUX VALGUS OF LEFT FOOT: ICD-10-CM

## 2024-11-18 PROCEDURE — 99024 POSTOP FOLLOW-UP VISIT: CPT | Performed by: PODIATRIST

## 2024-11-18 NOTE — PROGRESS NOTES
Assessment/Plan:     The patient's clinical examination today significant for well-healed incision lines to the dorsal aspect of the left forefoot.  The patient is doing well from a clinical standpoint.  He has no pain or discomfort to the hallux or second toe or to the ball of his foot.  There is no erythema nor edema nor calor or ecchymosis.  Pedal pulses are palpable.    There is residual elevatus of the left second toe likely secondary to plantar plate disruption.  The patient is happy with his current clinical course as he is not having any pain or discomfort to his left foot.  As long as he remains asymptomatic he may resume activities and shoe gear as tolerated.  Ultimately if he becomes symptomatic, he may need a plantar plate repair of the second metatarsal phalangeal joint.    As he is doing well, he can follow-up me on an as-needed basis.       Diagnoses and all orders for this visit:    Postoperative state    Hallux valgus of left foot    Hammer toe of left foot          Subjective:     Patient ID: Boubacar Burnett Jr is a 64 y.o. male.    The patient presents today for follow-up status post repair of a severe left foot bunion and second hammertoe deformity.  He has been doing well and notes no pain or discomfort to the postoperative site.  He has been ambulating regular shoes without any difficulty.  He has resumed his work duties and is tolerating it well.        Review of Systems   Constitutional: Negative.    HENT: Negative.     Eyes: Negative.    Respiratory: Negative.     Cardiovascular: Negative.    Endocrine: Negative.    Musculoskeletal: Negative.    Neurological: Negative.    Hematological: Negative.    Psychiatric/Behavioral: Negative.           Objective:     Physical Exam  Vitals reviewed.   Constitutional:       Appearance: Normal appearance.   HENT:      Head: Normocephalic and atraumatic.      Nose: Nose normal.   Eyes:      Conjunctiva/sclera: Conjunctivae normal.      Pupils: Pupils  are equal, round, and reactive to light.   Cardiovascular:      Pulses:           Dorsalis pedis pulses are 2+ on the left side.        Posterior tibial pulses are 2+ on the left side.   Pulmonary:      Effort: Pulmonary effort is normal.   Feet:      Left foot:      Skin integrity: Skin integrity normal.      Comments: The patient's clinical examination today significant for well-healed incision lines to the dorsal aspect of the left forefoot.  The patient is doing well from a clinical standpoint.  He has no pain or discomfort to the hallux or second toe or to the ball of his foot.  There is no erythema nor edema nor calor or ecchymosis.  Pedal pulses are palpable.  Skin:     General: Skin is warm.      Capillary Refill: Capillary refill takes less than 2 seconds.   Neurological:      General: No focal deficit present.      Mental Status: He is alert and oriented to person, place, and time.   Psychiatric:         Mood and Affect: Mood normal.         Behavior: Behavior normal.         Thought Content: Thought content normal.

## (undated) DEVICE — CANNULATED DRILL: Brand: ASNIS

## (undated) DEVICE — STERILE BETHLEHEM PLASTIC HAND: Brand: CARDINAL HEALTH

## (undated) DEVICE — PADDING CAST 4 IN  COTTON STRL

## (undated) DEVICE — COVER LIGHT HANDLE RIGID -2/PK

## (undated) DEVICE — GLOVE SRG BIOGEL 7.5

## (undated) DEVICE — SYRINGE 10ML LL

## (undated) DEVICE — CANNULATED COUNTERSINK: Brand: ASNIS

## (undated) DEVICE — OCCLUSIVE GAUZE STRIP,3% BISMUTH TRIBROMOPHENATE IN PETROLATUM BLEND: Brand: XEROFORM

## (undated) DEVICE — SUT VICRYL 3-0 SH 27 IN J416H

## (undated) DEVICE — PAD CAST 4 IN COTTON NON STERILE

## (undated) DEVICE — HAMMERTOE DRILL: Brand: PHALINX

## (undated) DEVICE — KERLIX BANDAGE ROLL: Brand: KERLIX

## (undated) DEVICE — LIGHT HANDLE COVER SLEEVE DISP BLUE STELLAR

## (undated) DEVICE — NEEDLE 25G X 1 1/2

## (undated) DEVICE — 10FR FRAZIER SUCTION HANDLE: Brand: CARDINAL HEALTH

## (undated) DEVICE — PAD CAST 6 IN COTTON NON STERILE

## (undated) DEVICE — STANDARD DRILL BIT , AO

## (undated) DEVICE — CUFF TOURNIQUET 18 X 5.5 IN QUICK CONNECT 2BLA

## (undated) DEVICE — TUBING SUCTION 5MM X 12 FT

## (undated) DEVICE — CHLORAPREP HI-LITE 26ML ORANGE

## (undated) DEVICE — INTENDED FOR TISSUE SEPARATION, AND OTHER PROCEDURES THAT REQUIRE A SHARP SURGICAL BLADE TO PUNCTURE OR CUT.: Brand: BARD-PARKER ® CARBON RIB-BACK BLADES

## (undated) DEVICE — THIN OFFSET (9.0 X 0.38 X 25.0MM)

## (undated) DEVICE — U-DRAPE: Brand: CONVERTORS

## (undated) DEVICE — NEPTUNE E-SEP SMOKE EVACUATION PENCIL, COATED, 70MM BLADE, PUSH BUTTON SWITCH: Brand: NEPTUNE E-SEP

## (undated) DEVICE — ACE WRAP 4 IN UNSTERILE

## (undated) DEVICE — STRETCH BANDAGE: Brand: CURITY

## (undated) DEVICE — GLOVE INDICATOR PI UNDERGLOVE SZ 7.5 BLUE

## (undated) DEVICE — SUT ETHILON 4-0 PS-2 18 IN 1667H

## (undated) DEVICE — ABDOMINAL PAD: Brand: DERMACEA

## (undated) DEVICE — ACE WRAP 6 IN UNSTERILE

## (undated) DEVICE — GAUZE SPONGES,16 PLY: Brand: CURITY

## (undated) DEVICE — Device

## (undated) DEVICE — K-WIRE
Type: IMPLANTABLE DEVICE | Site: FOOT | Status: NON-FUNCTIONAL
Brand: ASNIS
Removed: 2024-09-13

## (undated) DEVICE — GROUNDING PAD UNIVERSAL SLW